# Patient Record
Sex: MALE | Race: WHITE | NOT HISPANIC OR LATINO | Employment: FULL TIME | URBAN - METROPOLITAN AREA
[De-identification: names, ages, dates, MRNs, and addresses within clinical notes are randomized per-mention and may not be internally consistent; named-entity substitution may affect disease eponyms.]

---

## 2023-12-04 ENCOUNTER — HOSPITAL ENCOUNTER (EMERGENCY)
Facility: HOSPITAL | Age: 58
Discharge: HOME | End: 2023-12-04
Payer: COMMERCIAL

## 2023-12-04 VITALS
TEMPERATURE: 98.2 F | BODY MASS INDEX: 39.78 KG/M2 | HEIGHT: 74 IN | DIASTOLIC BLOOD PRESSURE: 96 MMHG | WEIGHT: 310 LBS | OXYGEN SATURATION: 97 % | SYSTOLIC BLOOD PRESSURE: 159 MMHG | HEART RATE: 74 BPM | RESPIRATION RATE: 18 BRPM

## 2023-12-04 DIAGNOSIS — S61.411A LACERATION OF RIGHT HAND WITHOUT FOREIGN BODY, INITIAL ENCOUNTER: Primary | ICD-10-CM

## 2023-12-04 PROCEDURE — 99284 EMERGENCY DEPT VISIT MOD MDM: CPT

## 2023-12-04 PROCEDURE — 90471 IMMUNIZATION ADMIN: CPT | Performed by: PHYSICIAN ASSISTANT

## 2023-12-04 PROCEDURE — 90715 TDAP VACCINE 7 YRS/> IM: CPT | Performed by: PHYSICIAN ASSISTANT

## 2023-12-04 PROCEDURE — 2500000004 HC RX 250 GENERAL PHARMACY W/ HCPCS (ALT 636 FOR OP/ED): Performed by: PHYSICIAN ASSISTANT

## 2023-12-04 PROCEDURE — 12002 RPR S/N/AX/GEN/TRNK2.6-7.5CM: CPT | Performed by: PHYSICIAN ASSISTANT

## 2023-12-04 PROCEDURE — 99283 EMERGENCY DEPT VISIT LOW MDM: CPT | Mod: 25

## 2023-12-04 RX ADMIN — TETANUS TOXOID, REDUCED DIPHTHERIA TOXOID AND ACELLULAR PERTUSSIS VACCINE, ADSORBED 0.5 ML: 5; 2.5; 8; 8; 2.5 SUSPENSION INTRAMUSCULAR at 13:23

## 2023-12-04 ASSESSMENT — COLUMBIA-SUICIDE SEVERITY RATING SCALE - C-SSRS
6. HAVE YOU EVER DONE ANYTHING, STARTED TO DO ANYTHING, OR PREPARED TO DO ANYTHING TO END YOUR LIFE?: NO
1. IN THE PAST MONTH, HAVE YOU WISHED YOU WERE DEAD OR WISHED YOU COULD GO TO SLEEP AND NOT WAKE UP?: NO
2. HAVE YOU ACTUALLY HAD ANY THOUGHTS OF KILLING YOURSELF?: NO

## 2023-12-04 NOTE — ED PROVIDER NOTES
EMERGENCY MEDICINE EVALUATION NOTE    History of Present Illness     Chief Complaint:   Chief Complaint   Patient presents with    Laceration       HPI: Kaz Michel is a 58 y.o. male presents with a chief complaint of to the right hand.  Patient reports that he was working on an ice machine when he dropped the part.  He states that he reached into grab the part and the metal door fell down onto the right hand.  Patient states that he has a laceration over the right hand onto the wrist.  Patient denies any loss of range of motion to the wrist.  Patient states that he came in because he felt the laceration stitches.  Patient denies any other significant past medical history denies medication allergies.  Patient is unsure of last tetanus.    Previous History   No past medical history on file.  No past surgical history on file.     No family history on file.  No Known Allergies  No current outpatient medications    Physical Exam     Appearance: Alert, oriented , cooperative,  in no acute distress.      Skin: 4 cm laceration over the dorsal aspect of the right hand onto the wrist.  Dry skin, no lesions, rash, petechiae or purpura.      Eyes: PERRLA, EOMs intact,  Conjunctiva pink      ENT: Hearing grossly intact.      Neck: Supple. Trachea at midline.     Pulmonary: Clear bilaterally. No rales, rhonchi or wheezing. No accessory muscle use or stridor.     Cardiac: Normal rate and rhythm without murmur     Abdomen: Soft, nontender, active bowel sounds.     Musculoskeletal: Full range of motion.  Patient can flex and extend wrist with no difficulty.  There is no appear to be any tendon involvement.     Neurological:Cranial nerves II through XII are grossly intact, normal sensation, no weakness, no focal findings identified.     Results   Labs Reviewed - No data to display  No orders to display         ED Course & Medical Decision Making     Medications   diphth,pertus(acell),tetanus (BoostRIX) 2.5-8-5 Lf-mcg-Lf/0.5mL  "vaccine 0.5 mL (has no administration in time range)     Heart Rate:  [74]   Temp:  [36.8 °C (98.2 °F)]   Resp:  [18]   BP: (159)/(96)   Height:  [188 cm (6' 2\")]   Weight:  [141 kg (310 lb)]   SpO2:  [97 %]    Diagnoses as of 12/04/23 1311   Laceration of right hand without foreign body, initial encounter     Kaz Michel is a 58 y.o. male presents with a chief complaint of to the right hand.  Patient reports that he was working on an ice machine when he dropped the part.  He states that he reached into grab the part and the metal door fell down onto the right hand.  Patient states that he has a laceration over the right hand onto the wrist.  Patient denies any loss of range of motion to the wrist.  Patient states that he came in because he felt the laceration stitches.  Patient denies any other significant past medical history denies medication allergies.  Patient had laceration repaired in the emergency department today by physician assistant student with my direct supervision.  Patient will have tetanus shot updated prior to discharge.  Patient was instructed to have sutures removed in 7 to 10 days or return here immediately with any worsening symptoms.  Patient is in agreement with the plan of care.  He was also instructed to watch out for signs and symptoms of infection.    Procedures   Laceration Repair    Performed by: Robby Medina PA-C  Authorized by: Robby Medina PA-C    Consent:     Consent obtained:  Verbal    Consent given by:  Patient    Risks discussed:  Infection, pain and poor cosmetic result  Laceration details:     Location:  Hand    Hand location:  R wrist    Length (cm):  4  Exploration:     Imaging outcome: foreign body not noted      Wound exploration: wound explored through full range of motion    Treatment:     Area cleansed with:  Chlorhexidine and saline    Amount of cleaning:  Standard    Irrigation solution:  Sterile saline    Visualized foreign bodies/material removed: no      " Debridement:  None  Skin repair:     Repair method:  Sutures    Suture size:  4-0    Suture material:  Nylon    Suture technique:  Simple interrupted    Number of sutures:  8  Repair type:     Repair type:  Simple  Post-procedure details:     Dressing:  Open (no dressing)    Procedure completion:  Tolerated      Diagnosis     1. Laceration of right hand without foreign body, initial encounter        Disposition   Discharged    ED Prescriptions    None         Disclaimer: This note was dictated by speech recognition. Minor errors in transcription may be present. Please call if questions.       Robby Medina PA-C  12/04/23 4581

## 2023-12-04 NOTE — ED TRIAGE NOTES
Patient cut right dorsal thumb about 5 cm while working on ice machine.  This occurred within the last 2 hours, .bleding controlled ROM and strength appears to be intact.

## 2024-05-22 ENCOUNTER — APPOINTMENT (OUTPATIENT)
Dept: CARDIOLOGY | Facility: HOSPITAL | Age: 59
End: 2024-05-22
Payer: COMMERCIAL

## 2024-05-22 ENCOUNTER — HOSPITAL ENCOUNTER (OUTPATIENT)
Facility: HOSPITAL | Age: 59
Setting detail: OBSERVATION
Discharge: HOME | End: 2024-05-23
Attending: EMERGENCY MEDICINE | Admitting: INTERNAL MEDICINE
Payer: COMMERCIAL

## 2024-05-22 ENCOUNTER — APPOINTMENT (OUTPATIENT)
Dept: RADIOLOGY | Facility: HOSPITAL | Age: 59
End: 2024-05-22
Payer: COMMERCIAL

## 2024-05-22 DIAGNOSIS — R07.89 OTHER CHEST PAIN: ICD-10-CM

## 2024-05-22 DIAGNOSIS — R07.9 CHEST PAIN, UNSPECIFIED TYPE: Primary | ICD-10-CM

## 2024-05-22 LAB
ALBUMIN SERPL-MCNC: 4.3 G/DL (ref 3.5–5)
ALP BLD-CCNC: 127 U/L (ref 35–125)
ALT SERPL-CCNC: 19 U/L (ref 5–40)
ANION GAP SERPL CALC-SCNC: 17 MMOL/L
AST SERPL-CCNC: 17 U/L (ref 5–40)
BASOPHILS # BLD AUTO: 0.06 X10*3/UL (ref 0–0.1)
BASOPHILS NFR BLD AUTO: 0.9 %
BILIRUB SERPL-MCNC: 0.5 MG/DL (ref 0.1–1.2)
BUN SERPL-MCNC: 15 MG/DL (ref 8–25)
CALCIUM SERPL-MCNC: 9.1 MG/DL (ref 8.5–10.4)
CHLORIDE SERPL-SCNC: 100 MMOL/L (ref 97–107)
CO2 SERPL-SCNC: 19 MMOL/L (ref 24–31)
CREAT SERPL-MCNC: 1 MG/DL (ref 0.4–1.6)
EGFRCR SERPLBLD CKD-EPI 2021: 87 ML/MIN/1.73M*2
EOSINOPHIL # BLD AUTO: 0.15 X10*3/UL (ref 0–0.7)
EOSINOPHIL NFR BLD AUTO: 2.2 %
ERYTHROCYTE [DISTWIDTH] IN BLOOD BY AUTOMATED COUNT: 13.1 % (ref 11.5–14.5)
GLUCOSE SERPL-MCNC: 250 MG/DL (ref 65–99)
HCT VFR BLD AUTO: 48.8 % (ref 41–52)
HGB BLD-MCNC: 16.5 G/DL (ref 13.5–17.5)
IMM GRANULOCYTES # BLD AUTO: 0.02 X10*3/UL (ref 0–0.7)
IMM GRANULOCYTES NFR BLD AUTO: 0.3 % (ref 0–0.9)
LIPASE SERPL-CCNC: 56 U/L (ref 16–63)
LYMPHOCYTES # BLD AUTO: 2.04 X10*3/UL (ref 1.2–4.8)
LYMPHOCYTES NFR BLD AUTO: 29.4 %
MAGNESIUM SERPL-MCNC: 1.9 MG/DL (ref 1.6–3.1)
MCH RBC QN AUTO: 31.9 PG (ref 26–34)
MCHC RBC AUTO-ENTMCNC: 33.8 G/DL (ref 32–36)
MCV RBC AUTO: 94 FL (ref 80–100)
MONOCYTES # BLD AUTO: 0.96 X10*3/UL (ref 0.1–1)
MONOCYTES NFR BLD AUTO: 13.8 %
NEUTROPHILS # BLD AUTO: 3.72 X10*3/UL (ref 1.2–7.7)
NEUTROPHILS NFR BLD AUTO: 53.4 %
NRBC BLD-RTO: 0 /100 WBCS (ref 0–0)
PLATELET # BLD AUTO: 300 X10*3/UL (ref 150–450)
POTASSIUM SERPL-SCNC: 3.8 MMOL/L (ref 3.4–5.1)
PROT SERPL-MCNC: 6.8 G/DL (ref 5.9–7.9)
RBC # BLD AUTO: 5.18 X10*6/UL (ref 4.5–5.9)
SODIUM SERPL-SCNC: 136 MMOL/L (ref 133–145)
TROPONIN T SERPL-MCNC: 15 NG/L
TROPONIN T SERPL-MCNC: 16 NG/L
TROPONIN T SERPL-MCNC: 16 NG/L
WBC # BLD AUTO: 7 X10*3/UL (ref 4.4–11.3)

## 2024-05-22 PROCEDURE — 93306 TTE W/DOPPLER COMPLETE: CPT

## 2024-05-22 PROCEDURE — 84484 ASSAY OF TROPONIN QUANT: CPT | Performed by: CLINICAL NURSE SPECIALIST

## 2024-05-22 PROCEDURE — 96372 THER/PROPH/DIAG INJ SC/IM: CPT | Performed by: NURSE PRACTITIONER

## 2024-05-22 PROCEDURE — 99285 EMERGENCY DEPT VISIT HI MDM: CPT | Mod: 25

## 2024-05-22 PROCEDURE — 71046 X-RAY EXAM CHEST 2 VIEWS: CPT

## 2024-05-22 PROCEDURE — G0378 HOSPITAL OBSERVATION PER HR: HCPCS

## 2024-05-22 PROCEDURE — 83690 ASSAY OF LIPASE: CPT | Performed by: CLINICAL NURSE SPECIALIST

## 2024-05-22 PROCEDURE — 2500000004 HC RX 250 GENERAL PHARMACY W/ HCPCS (ALT 636 FOR OP/ED): Performed by: NURSE PRACTITIONER

## 2024-05-22 PROCEDURE — 36415 COLL VENOUS BLD VENIPUNCTURE: CPT | Performed by: CLINICAL NURSE SPECIALIST

## 2024-05-22 PROCEDURE — 71046 X-RAY EXAM CHEST 2 VIEWS: CPT | Performed by: RADIOLOGY

## 2024-05-22 PROCEDURE — 83735 ASSAY OF MAGNESIUM: CPT | Performed by: CLINICAL NURSE SPECIALIST

## 2024-05-22 PROCEDURE — 2500000001 HC RX 250 WO HCPCS SELF ADMINISTERED DRUGS (ALT 637 FOR MEDICARE OP): Performed by: CLINICAL NURSE SPECIALIST

## 2024-05-22 PROCEDURE — 2500000004 HC RX 250 GENERAL PHARMACY W/ HCPCS (ALT 636 FOR OP/ED)

## 2024-05-22 PROCEDURE — 84075 ASSAY ALKALINE PHOSPHATASE: CPT | Performed by: CLINICAL NURSE SPECIALIST

## 2024-05-22 PROCEDURE — 93005 ELECTROCARDIOGRAM TRACING: CPT

## 2024-05-22 PROCEDURE — 99222 1ST HOSP IP/OBS MODERATE 55: CPT | Performed by: NURSE PRACTITIONER

## 2024-05-22 PROCEDURE — 2500000001 HC RX 250 WO HCPCS SELF ADMINISTERED DRUGS (ALT 637 FOR MEDICARE OP): Performed by: NURSE PRACTITIONER

## 2024-05-22 PROCEDURE — 85025 COMPLETE CBC W/AUTO DIFF WBC: CPT | Performed by: CLINICAL NURSE SPECIALIST

## 2024-05-22 PROCEDURE — 2500000006 HC RX 250 W HCPCS SELF ADMINISTERED DRUGS (ALT 637 FOR ALL PAYERS): Performed by: NURSE PRACTITIONER

## 2024-05-22 PROCEDURE — 93306 TTE W/DOPPLER COMPLETE: CPT | Performed by: INTERNAL MEDICINE

## 2024-05-22 RX ORDER — ACETAMINOPHEN 325 MG/1
650 TABLET ORAL EVERY 4 HOURS PRN
Status: DISCONTINUED | OUTPATIENT
Start: 2024-05-22 | End: 2024-05-23 | Stop reason: HOSPADM

## 2024-05-22 RX ORDER — NITROGLYCERIN 0.4 MG/1
0.4 TABLET SUBLINGUAL ONCE
Status: COMPLETED | OUTPATIENT
Start: 2024-05-22 | End: 2024-05-22

## 2024-05-22 RX ORDER — NAPROXEN SODIUM 220 MG/1
324 TABLET, FILM COATED ORAL ONCE
Status: COMPLETED | OUTPATIENT
Start: 2024-05-22 | End: 2024-05-22

## 2024-05-22 RX ORDER — LAMOTRIGINE 200 MG/1
200 TABLET ORAL 2 TIMES DAILY
COMMUNITY

## 2024-05-22 RX ORDER — NAPROXEN SODIUM 220 MG/1
81 TABLET, FILM COATED ORAL DAILY
Status: DISCONTINUED | OUTPATIENT
Start: 2024-05-23 | End: 2024-05-23 | Stop reason: HOSPADM

## 2024-05-22 RX ORDER — LISINOPRIL 20 MG/1
20 TABLET ORAL DAILY
Status: DISCONTINUED | OUTPATIENT
Start: 2024-05-23 | End: 2024-05-23 | Stop reason: HOSPADM

## 2024-05-22 RX ORDER — PANTOPRAZOLE SODIUM 40 MG/10ML
40 INJECTION, POWDER, LYOPHILIZED, FOR SOLUTION INTRAVENOUS DAILY
Status: DISCONTINUED | OUTPATIENT
Start: 2024-05-22 | End: 2024-05-23 | Stop reason: HOSPADM

## 2024-05-22 RX ORDER — DEXTROSE 50 % IN WATER (D50W) INTRAVENOUS SYRINGE
12.5
Status: DISCONTINUED | OUTPATIENT
Start: 2024-05-22 | End: 2024-05-23 | Stop reason: HOSPADM

## 2024-05-22 RX ORDER — BUPROPION HYDROCHLORIDE 300 MG/1
300 TABLET ORAL DAILY
COMMUNITY

## 2024-05-22 RX ORDER — HEPARIN SODIUM 5000 [USP'U]/ML
5000 INJECTION, SOLUTION INTRAVENOUS; SUBCUTANEOUS EVERY 8 HOURS SCHEDULED
Status: DISCONTINUED | OUTPATIENT
Start: 2024-05-22 | End: 2024-05-23 | Stop reason: HOSPADM

## 2024-05-22 RX ORDER — LAMOTRIGINE 200 MG/1
200 TABLET ORAL 2 TIMES DAILY
Status: DISCONTINUED | OUTPATIENT
Start: 2024-05-22 | End: 2024-05-22

## 2024-05-22 RX ORDER — SIMVASTATIN 40 MG/1
40 TABLET, FILM COATED ORAL NIGHTLY
Status: DISCONTINUED | OUTPATIENT
Start: 2024-05-22 | End: 2024-05-23 | Stop reason: HOSPADM

## 2024-05-22 RX ORDER — LAMOTRIGINE 200 MG/1
200 TABLET ORAL 2 TIMES DAILY
Status: DISCONTINUED | OUTPATIENT
Start: 2024-05-22 | End: 2024-05-23 | Stop reason: HOSPADM

## 2024-05-22 RX ORDER — GLIPIZIDE 10 MG/1
10 TABLET ORAL
Status: DISCONTINUED | OUTPATIENT
Start: 2024-05-22 | End: 2024-05-23 | Stop reason: HOSPADM

## 2024-05-22 RX ORDER — METFORMIN HYDROCHLORIDE 1000 MG/1
1000 TABLET ORAL
COMMUNITY

## 2024-05-22 RX ORDER — NITROGLYCERIN 0.4 MG/1
0.4 TABLET SUBLINGUAL EVERY 5 MIN PRN
Status: DISCONTINUED | OUTPATIENT
Start: 2024-05-22 | End: 2024-05-23 | Stop reason: HOSPADM

## 2024-05-22 RX ORDER — ONDANSETRON HYDROCHLORIDE 2 MG/ML
4 INJECTION, SOLUTION INTRAVENOUS EVERY 6 HOURS PRN
Status: DISCONTINUED | OUTPATIENT
Start: 2024-05-22 | End: 2024-05-23 | Stop reason: HOSPADM

## 2024-05-22 RX ORDER — LISINOPRIL 20 MG/1
20 TABLET ORAL DAILY
COMMUNITY

## 2024-05-22 RX ORDER — ACETAMINOPHEN 160 MG/5ML
650 SOLUTION ORAL EVERY 4 HOURS PRN
Status: DISCONTINUED | OUTPATIENT
Start: 2024-05-22 | End: 2024-05-23 | Stop reason: HOSPADM

## 2024-05-22 RX ORDER — GLIPIZIDE 10 MG/1
10 TABLET ORAL
COMMUNITY

## 2024-05-22 RX ORDER — BUPROPION HYDROCHLORIDE 300 MG/1
300 TABLET ORAL NIGHTLY
Status: DISCONTINUED | OUTPATIENT
Start: 2024-05-22 | End: 2024-05-23 | Stop reason: HOSPADM

## 2024-05-22 RX ORDER — DEXTROSE 50 % IN WATER (D50W) INTRAVENOUS SYRINGE
25
Status: DISCONTINUED | OUTPATIENT
Start: 2024-05-22 | End: 2024-05-23 | Stop reason: HOSPADM

## 2024-05-22 RX ORDER — METFORMIN HYDROCHLORIDE 1000 MG/1
1000 TABLET ORAL
Status: DISCONTINUED | OUTPATIENT
Start: 2024-05-22 | End: 2024-05-23 | Stop reason: HOSPADM

## 2024-05-22 RX ORDER — ACETAMINOPHEN 650 MG/1
650 SUPPOSITORY RECTAL EVERY 4 HOURS PRN
Status: DISCONTINUED | OUTPATIENT
Start: 2024-05-22 | End: 2024-05-23 | Stop reason: HOSPADM

## 2024-05-22 RX ORDER — SIMVASTATIN 40 MG/1
40 TABLET, FILM COATED ORAL NIGHTLY
COMMUNITY

## 2024-05-22 RX ORDER — PANTOPRAZOLE SODIUM 40 MG/1
40 TABLET, DELAYED RELEASE ORAL
COMMUNITY

## 2024-05-22 RX ORDER — BUPROPION HYDROCHLORIDE 300 MG/1
300 TABLET ORAL DAILY
Status: DISCONTINUED | OUTPATIENT
Start: 2024-05-22 | End: 2024-05-22

## 2024-05-22 RX ADMIN — NITROGLYCERIN 0.4 MG: 0.4 TABLET SUBLINGUAL at 08:35

## 2024-05-22 RX ADMIN — GLIPIZIDE 10 MG: 5 TABLET ORAL at 15:12

## 2024-05-22 RX ADMIN — ASPIRIN 324 MG: 81 TABLET, CHEWABLE ORAL at 08:35

## 2024-05-22 RX ADMIN — BUPROPION HYDROCHLORIDE 300 MG: 300 TABLET, FILM COATED, EXTENDED RELEASE ORAL at 20:54

## 2024-05-22 RX ADMIN — PERFLUTREN 2 ML OF DILUTION: 6.52 INJECTION, SUSPENSION INTRAVENOUS at 13:26

## 2024-05-22 RX ADMIN — METFORMIN HYDROCHLORIDE 1000 MG: 1000 TABLET ORAL at 16:29

## 2024-05-22 RX ADMIN — SIMVASTATIN 40 MG: 40 TABLET, FILM COATED ORAL at 20:54

## 2024-05-22 RX ADMIN — HEPARIN SODIUM 5000 UNITS: 5000 INJECTION, SOLUTION INTRAVENOUS; SUBCUTANEOUS at 14:00

## 2024-05-22 RX ADMIN — LAMOTRIGINE 200 MG: 200 TABLET ORAL at 20:54

## 2024-05-22 RX ADMIN — HEPARIN SODIUM 5000 UNITS: 5000 INJECTION, SOLUTION INTRAVENOUS; SUBCUTANEOUS at 23:20

## 2024-05-22 SDOH — SOCIAL STABILITY: SOCIAL NETWORK
DO YOU BELONG TO ANY CLUBS OR ORGANIZATIONS SUCH AS CHURCH GROUPS UNIONS, FRATERNAL OR ATHLETIC GROUPS, OR SCHOOL GROUPS?: NO

## 2024-05-22 SDOH — SOCIAL STABILITY: SOCIAL INSECURITY
WITHIN THE LAST YEAR, HAVE TO BEEN RAPED OR FORCED TO HAVE ANY KIND OF SEXUAL ACTIVITY BY YOUR PARTNER OR EX-PARTNER?: NO

## 2024-05-22 SDOH — SOCIAL STABILITY: SOCIAL INSECURITY
WITHIN THE LAST YEAR, HAVE YOU BEEN KICKED, HIT, SLAPPED, OR OTHERWISE PHYSICALLY HURT BY YOUR PARTNER OR EX-PARTNER?: NO

## 2024-05-22 SDOH — SOCIAL STABILITY: SOCIAL INSECURITY: WITHIN THE LAST YEAR, HAVE YOU BEEN AFRAID OF YOUR PARTNER OR EX-PARTNER?: NO

## 2024-05-22 SDOH — ECONOMIC STABILITY: FOOD INSECURITY: WITHIN THE PAST 12 MONTHS, THE FOOD YOU BOUGHT JUST DIDN'T LAST AND YOU DIDN'T HAVE MONEY TO GET MORE.: NEVER TRUE

## 2024-05-22 SDOH — SOCIAL STABILITY: SOCIAL INSECURITY: DOES ANYONE TRY TO KEEP YOU FROM HAVING/CONTACTING OTHER FRIENDS OR DOING THINGS OUTSIDE YOUR HOME?: NO

## 2024-05-22 SDOH — ECONOMIC STABILITY: HOUSING INSECURITY
IN THE LAST 12 MONTHS, WAS THERE A TIME WHEN YOU DID NOT HAVE A STEADY PLACE TO SLEEP OR SLEPT IN A SHELTER (INCLUDING NOW)?: NO

## 2024-05-22 SDOH — HEALTH STABILITY: MENTAL HEALTH
STRESS IS WHEN SOMEONE FEELS TENSE, NERVOUS, ANXIOUS, OR CAN'T SLEEP AT NIGHT BECAUSE THEIR MIND IS TROUBLED. HOW STRESSED ARE YOU?: NOT AT ALL

## 2024-05-22 SDOH — SOCIAL STABILITY: SOCIAL NETWORK: ARE YOU MARRIED, WIDOWED, DIVORCED, SEPARATED, NEVER MARRIED, OR LIVING WITH A PARTNER?: MARRIED

## 2024-05-22 SDOH — SOCIAL STABILITY: SOCIAL INSECURITY: HAVE YOU HAD ANY THOUGHTS OF HARMING ANYONE ELSE?: NO

## 2024-05-22 SDOH — ECONOMIC STABILITY: INCOME INSECURITY: HOW HARD IS IT FOR YOU TO PAY FOR THE VERY BASICS LIKE FOOD, HOUSING, MEDICAL CARE, AND HEATING?: NOT HARD AT ALL

## 2024-05-22 SDOH — SOCIAL STABILITY: SOCIAL INSECURITY: DO YOU FEEL ANYONE HAS EXPLOITED OR TAKEN ADVANTAGE OF YOU FINANCIALLY OR OF YOUR PERSONAL PROPERTY?: NO

## 2024-05-22 SDOH — SOCIAL STABILITY: SOCIAL INSECURITY: WITHIN THE LAST YEAR, HAVE YOU BEEN HUMILIATED OR EMOTIONALLY ABUSED IN OTHER WAYS BY YOUR PARTNER OR EX-PARTNER?: NO

## 2024-05-22 SDOH — SOCIAL STABILITY: SOCIAL INSECURITY: DO YOU FEEL UNSAFE GOING BACK TO THE PLACE WHERE YOU ARE LIVING?: NO

## 2024-05-22 SDOH — ECONOMIC STABILITY: INCOME INSECURITY: IN THE LAST 12 MONTHS, WAS THERE A TIME WHEN YOU WERE NOT ABLE TO PAY THE MORTGAGE OR RENT ON TIME?: NO

## 2024-05-22 SDOH — ECONOMIC STABILITY: HOUSING INSECURITY: IN THE LAST 12 MONTHS, HOW MANY PLACES HAVE YOU LIVED?: 1

## 2024-05-22 SDOH — ECONOMIC STABILITY: TRANSPORTATION INSECURITY
IN THE PAST 12 MONTHS, HAS LACK OF TRANSPORTATION KEPT YOU FROM MEETINGS, WORK, OR FROM GETTING THINGS NEEDED FOR DAILY LIVING?: NO

## 2024-05-22 SDOH — ECONOMIC STABILITY: FOOD INSECURITY: WITHIN THE PAST 12 MONTHS, YOU WORRIED THAT YOUR FOOD WOULD RUN OUT BEFORE YOU GOT MONEY TO BUY MORE.: NEVER TRUE

## 2024-05-22 SDOH — SOCIAL STABILITY: SOCIAL INSECURITY: ARE THERE ANY APPARENT SIGNS OF INJURIES/BEHAVIORS THAT COULD BE RELATED TO ABUSE/NEGLECT?: NO

## 2024-05-22 SDOH — SOCIAL STABILITY: SOCIAL INSECURITY: HAS ANYONE EVER THREATENED TO HURT YOUR FAMILY OR YOUR PETS?: NO

## 2024-05-22 SDOH — SOCIAL STABILITY: SOCIAL NETWORK: HOW OFTEN DO YOU GET TOGETHER WITH FRIENDS OR RELATIVES?: NEVER

## 2024-05-22 SDOH — SOCIAL STABILITY: SOCIAL INSECURITY: HAVE YOU HAD THOUGHTS OF HARMING ANYONE ELSE?: YES

## 2024-05-22 SDOH — SOCIAL STABILITY: SOCIAL INSECURITY: ARE YOU OR HAVE YOU BEEN THREATENED OR ABUSED PHYSICALLY, EMOTIONALLY, OR SEXUALLY BY ANYONE?: NO

## 2024-05-22 SDOH — SOCIAL STABILITY: SOCIAL NETWORK: IN A TYPICAL WEEK, HOW MANY TIMES DO YOU TALK ON THE PHONE WITH FAMILY, FRIENDS, OR NEIGHBORS?: NEVER

## 2024-05-22 SDOH — SOCIAL STABILITY: SOCIAL NETWORK: HOW OFTEN DO YOU ATTENT MEETINGS OF THE CLUB OR ORGANIZATION YOU BELONG TO?: NEVER

## 2024-05-22 SDOH — SOCIAL STABILITY: SOCIAL INSECURITY: WERE YOU ABLE TO COMPLETE ALL THE BEHAVIORAL HEALTH SCREENINGS?: YES

## 2024-05-22 SDOH — ECONOMIC STABILITY: TRANSPORTATION INSECURITY
IN THE PAST 12 MONTHS, HAS THE LACK OF TRANSPORTATION KEPT YOU FROM MEDICAL APPOINTMENTS OR FROM GETTING MEDICATIONS?: NO

## 2024-05-22 SDOH — HEALTH STABILITY: PHYSICAL HEALTH: ON AVERAGE, HOW MANY MINUTES DO YOU ENGAGE IN EXERCISE AT THIS LEVEL?: 0 MIN

## 2024-05-22 SDOH — SOCIAL STABILITY: SOCIAL INSECURITY: ABUSE: ADULT

## 2024-05-22 SDOH — SOCIAL STABILITY: SOCIAL NETWORK: HOW OFTEN DO YOU ATTEND CHURCH OR RELIGIOUS SERVICES?: NEVER

## 2024-05-22 SDOH — HEALTH STABILITY: PHYSICAL HEALTH: ON AVERAGE, HOW MANY DAYS PER WEEK DO YOU ENGAGE IN MODERATE TO STRENUOUS EXERCISE (LIKE A BRISK WALK)?: 0 DAYS

## 2024-05-22 ASSESSMENT — PAIN SCALES - WONG BAKER: WONGBAKER_NUMERICALRESPONSE: NO HURT

## 2024-05-22 ASSESSMENT — COGNITIVE AND FUNCTIONAL STATUS - GENERAL
DAILY ACTIVITIY SCORE: 24
DAILY ACTIVITIY SCORE: 24
MOBILITY SCORE: 24
MOBILITY SCORE: 24
PATIENT BASELINE BEDBOUND: NO

## 2024-05-22 ASSESSMENT — PAIN DESCRIPTION - DESCRIPTORS
DESCRIPTORS: PRESSURE
DESCRIPTORS: TIGHTNESS

## 2024-05-22 ASSESSMENT — ACTIVITIES OF DAILY LIVING (ADL)
GROOMING: INDEPENDENT
WALKS IN HOME: INDEPENDENT
JUDGMENT_ADEQUATE_SAFELY_COMPLETE_DAILY_ACTIVITIES: YES
LACK_OF_TRANSPORTATION: NO
HEARING - RIGHT EAR: FUNCTIONAL
HEARING - LEFT EAR: FUNCTIONAL
BATHING: INDEPENDENT
FEEDING YOURSELF: INDEPENDENT
DRESSING YOURSELF: INDEPENDENT
TOILETING: INDEPENDENT
ADEQUATE_TO_COMPLETE_ADL: YES
PATIENT'S MEMORY ADEQUATE TO SAFELY COMPLETE DAILY ACTIVITIES?: YES

## 2024-05-22 ASSESSMENT — PAIN DESCRIPTION - PROGRESSION: CLINICAL_PROGRESSION: NOT CHANGED

## 2024-05-22 ASSESSMENT — PAIN SCALES - GENERAL
PAINLEVEL_OUTOF10: 2
PAINLEVEL_OUTOF10: 4
PAINLEVEL_OUTOF10: 7
PAINLEVEL_OUTOF10: 6
PAINLEVEL_OUTOF10: 1
PAINLEVEL_OUTOF10: 0 - NO PAIN
PAINLEVEL_OUTOF10: 0 - NO PAIN

## 2024-05-22 ASSESSMENT — LIFESTYLE VARIABLES
SKIP TO QUESTIONS 9-10: 1
PRESCIPTION_ABUSE_PAST_12_MONTHS: NO
EVER HAD A DRINK FIRST THING IN THE MORNING TO STEADY YOUR NERVES TO GET RID OF A HANGOVER: NO
AUDIT-C TOTAL SCORE: 0
HOW OFTEN DO YOU HAVE A DRINK CONTAINING ALCOHOL: NEVER
TOTAL SCORE: 0
HOW OFTEN DO YOU HAVE 6 OR MORE DRINKS ON ONE OCCASION: NEVER
EVER FELT BAD OR GUILTY ABOUT YOUR DRINKING: NO
HAVE PEOPLE ANNOYED YOU BY CRITICIZING YOUR DRINKING: NO
HOW MANY STANDARD DRINKS CONTAINING ALCOHOL DO YOU HAVE ON A TYPICAL DAY: PATIENT DOES NOT DRINK
HAVE YOU EVER FELT YOU SHOULD CUT DOWN ON YOUR DRINKING: NO
SUBSTANCE_ABUSE_PAST_12_MONTHS: NO
AUDIT-C TOTAL SCORE: 0

## 2024-05-22 ASSESSMENT — COLUMBIA-SUICIDE SEVERITY RATING SCALE - C-SSRS
2. HAVE YOU ACTUALLY HAD ANY THOUGHTS OF KILLING YOURSELF?: NO
1. IN THE PAST MONTH, HAVE YOU WISHED YOU WERE DEAD OR WISHED YOU COULD GO TO SLEEP AND NOT WAKE UP?: NO
6. HAVE YOU EVER DONE ANYTHING, STARTED TO DO ANYTHING, OR PREPARED TO DO ANYTHING TO END YOUR LIFE?: NO

## 2024-05-22 ASSESSMENT — PAIN DESCRIPTION - PAIN TYPE: TYPE: ACUTE PAIN

## 2024-05-22 ASSESSMENT — PAIN DESCRIPTION - LOCATION: LOCATION: CHEST

## 2024-05-22 ASSESSMENT — PATIENT HEALTH QUESTIONNAIRE - PHQ9
SUM OF ALL RESPONSES TO PHQ9 QUESTIONS 1 & 2: 0
1. LITTLE INTEREST OR PLEASURE IN DOING THINGS: NOT AT ALL
2. FEELING DOWN, DEPRESSED OR HOPELESS: NOT AT ALL

## 2024-05-22 ASSESSMENT — HEART SCORE
TROPONIN: 1-3 TIMES NORMAL LIMIT
HEART SCORE: 4
HISTORY: SLIGHTLY SUSPICIOUS
ECG: NORMAL
RISK FACTORS: >2 RISK FACTORS OR HX OF ATHEROSCLEROTIC DISEASE
AGE: 45-64

## 2024-05-22 ASSESSMENT — PAIN DESCRIPTION - FREQUENCY: FREQUENCY: CONSTANT/CONTINUOUS

## 2024-05-22 ASSESSMENT — PAIN - FUNCTIONAL ASSESSMENT: PAIN_FUNCTIONAL_ASSESSMENT: 0-10

## 2024-05-22 NOTE — ED PROVIDER NOTES
The patient was seen in conjunction with the advanced practice provider, and I performed a substantive portion of the visit. I personally saw the patient and made/approved the management plan and take responsibility for the patient management. All medical decision making was performed by me. All laboratory studies, radiology, and EKGs were reviewed by me.     History: 58-year-old male presents for midsternal chest pain rating to the left side as well as left shoulder around 8 AM while he was driving in his vehicle.  No shortness of breath, nausea, diaphoresis.  No history of heart disease but does have hypertension, diabetes, and is a current smoker.  No ripping or tearing pain.  No radiation to back or abdomen.  No history of DVT or PE.  No recent travel, surgery, or immobilization.  No leg pain or swelling.  Physical exam:  Constitutional:  Awake, alert, well appearing, nontoxic  HEENT:  Normocephalic, atraumatic  Neck: Trachea midline, no stridor  Respiratory/Chest:  Clear to auscultation bilaterally, no wheezes, rhonchi, or rales  CV:  Regular rate and regular rhythm, no murmurs, gallops, or rubs, 2+ symmetrical bilateral radial pulses  Extremities/MSK: No peripheral edema, calf tenderness or palpable cords  Neuro: A/O, normal speech, strength 5/5 x 4, ambulates with a steady gait  Skin:  Warm and dry    MDM: Patient presents with chest pain most suspicious for ACS.  Additional consideration for but not limited to aortic dissection, mediastinitis, pericarditis, tamponade however based on history, physical exam, and workup I have low suspicion for these etiologies.  EKG with no evidence of STEMI.  Labs unremarkable.  Troponin 16, repeat 16.  Chest x-ray on my independent interpretation with no acute process therefore no evidence of pneumonia, clinically significant esophageal rupture/tear, pneumothorax.  Given heart score of 4 patient warrants admission for further workup and evaluation to rule out acute coronary  syndrome.  Patient has received full dose of aspirin.  Chest pain improved. Patient agreeable with this plan of care, admitted in stable condition.         Lisa Mai MD  05/22/24 3929

## 2024-05-22 NOTE — ED TRIAGE NOTES
Pt states that chest pain started at 0800 in the center of his chest radiating down his left arm.

## 2024-05-22 NOTE — CARE PLAN
The patient's goals for the shift include      The clinical goals for the shift include chest pain free    Problem: Pain  Goal: My pain/discomfort is manageable  Outcome: Progressing     Problem: Safety  Goal: Patient will be injury free during hospitalization  Outcome: Progressing  Goal: I will remain free of falls  Outcome: Progressing     Problem: Daily Care  Goal: Daily care needs are met  Outcome: Progressing     Problem: Psychosocial Needs  Goal: Demonstrates ability to cope with hospitalization/illness  Outcome: Progressing     Problem: Discharge Barriers  Goal: My discharge needs are met  Outcome: Progressing

## 2024-05-22 NOTE — Clinical Note
Is the patient on isolation?: No   Do you expect the patient to require telemetry (informational-only for bed management): Yes   Do you expect the patient to require observation or inpt? (informational-only for bed management): Observation   Bed Type: Telemetry [3]

## 2024-05-22 NOTE — ED PROVIDER NOTES
Department of Emergency Medicine   ED  Provider Note  Admit Date/RoomTime: 5/22/2024  8:14 AM  ED Room: 05/[facility]        History of Present Illness:  Chief Complaint   Patient presents with    Chest Pain         Kaz Michel is a 58 y.o. male history of hypertension, diabetes, family history of coronary artery disease, obesity presenting to the ED for left-sided chest pain radiating left arm onset of symptoms 8 AM described as pressure and throbbing in his left arm, onset of symptoms while driving.  No associated symptoms of dizziness shortness of breath nausea vomiting sweating.  No exertional chest pain.  His pain is constant and rates it a 6 or 7 out of 10.  Has not taken an aspirin reported directly to the emergency department for evaluation.  He has no history of chest pain.  Has had a stuffy nose but no cough congestion runny nose sore throat.  No increased weight gain reports has had a 37 pound weight loss since the last time he was seen at a doctor's office.  Denies swelling in his legs.    Review of Systems:   Pertinent positives and negatives are stated within HPI, all other systems reviewed and are negative.        --------------------------------------------- PAST HISTORY ---------------------------------------------  Past Medical History:  has no past medical history on file.  Past Surgical History:  has no past surgical history on file.  Social History:    Family History: family history is not on file.. Unless otherwise noted, family history is non contributory  The patient’s home medications have been reviewed.  Allergies: Patient has no known allergies.        ---------------------------------------------------PHYSICAL EXAM--------------------------------------    GENERAL APPEARANCE: Awake and alert.   VITAL SIGNS: As per the nurses' triage record.  Elevated blood pressure  HEENT: Normocephalic, atraumatic. Extraocular muscles are intact. Conjunctiva are pink. Negative scleral icterus. Mucous  "membranes are moist. Tongue in the midline. Pharynx was without erythema or exudates, uvula midline  NECK: Soft Nontender and supple, full gross ROM, no meningeal signs.  CHEST: Nontender to palpation. Clear to auscultation bilaterally. No rales, rhonchi, or wheezing.   HEART: S1, S2. Regular rate and rhythm. No murmurs, gallops or rubs.  Strong and equal pulses in the extremities.   ABDOMEN: Soft, nontender, nondistended, positive bowel sounds, no palpable masses.  MUSCULCSKELETAL: The calves are nontender to palpation. Full gross active range of motion. Ambulating on own with no acute difficulties  NEUROLOGICAL: Awake, alert and oriented x 3. Power intact in the upper and lower extremities. Sensation is intact to light touch in the upper and lower extremities.   IMMUNOLOGICAL: No lymphatic streaking noted   DERM: No petechiae, rashes, or ecchymoses.          ------------------------- NURSING NOTES AND VITALS REVIEWED ---------------------------  The nursing notes within the ED encounter and vital signs as below have been reviewed by myself  /80 (BP Location: Left arm, Patient Position: Sitting)   Pulse 70   Temp 36.1 °C (97 °F) (Temporal)   Resp 15   Ht 1.88 m (6' 2\")   Wt 125 kg (276 lb 6.4 oz)   SpO2 96%   BMI 35.49 kg/m²     Oxygen Saturation Interpretation: 99% room air    The cardiac monitor revealed sinus rhythm with a heart rate in the 80s as interpreted by me. The cardiac monitor was ordered secondary to the patient's heart rate and to monitor the patient for dysrhythmia.       The patient’s available past medical records and past encounters were reviewed.          -----------------------DIAGNOSTIC RESULTS------------------------  LABS:    Labs Reviewed   COMPREHENSIVE METABOLIC PANEL - Abnormal       Result Value    Glucose 250 (*)     Sodium 136      Potassium 3.8      Chloride 100      Bicarbonate 19 (*)     Urea Nitrogen 15      Creatinine 1.00      eGFR 87      Calcium 9.1      Albumin " 4.3      Alkaline Phosphatase 127 (*)     Total Protein 6.8      AST 17      Bilirubin, Total 0.5      ALT 19      Anion Gap 17     SERIAL TROPONIN, INITIAL ([facility]) - Abnormal    Troponin T, High Sensitivity 16 (*)    SERIAL TROPONIN,  2 HOUR ([facility]) - Abnormal    Troponin T, High Sensitivity 16 (*)    MAGNESIUM - Normal    Magnesium 1.90     LIPASE - Normal    Lipase 56     CBC WITH AUTO DIFFERENTIAL    WBC 7.0      nRBC 0.0      RBC 5.18      Hemoglobin 16.5      Hematocrit 48.8      MCV 94      MCH 31.9      MCHC 33.8      RDW 13.1      Platelets 300      Neutrophils % 53.4      Immature Granulocytes %, Automated 0.3      Lymphocytes % 29.4      Monocytes % 13.8      Eosinophils % 2.2      Basophils % 0.9      Neutrophils Absolute 3.72      Immature Granulocytes Absolute, Automated 0.02      Lymphocytes Absolute 2.04      Monocytes Absolute 0.96      Eosinophils Absolute 0.15      Basophils Absolute 0.06     TROPONIN T SERIES, HIGH SENSITIVITY (0, 2 HR, 6 HR)    Narrative:     The following orders were created for panel order Troponin T Series, High Sensitivity (0, 2HR, 6HR).  Procedure                               Abnormality         Status                     ---------                               -----------         ------                     Serial Troponin, Initial...[[id number]]  Abnormal            Final result               Serial Troponin, 2 Hour ...[[id number]]  Abnormal            Final result               Serial Troponin, 6 Hour ...[[id number]]                                                   Please view results for these tests on the individual orders.   SERIAL TROPONIN, 6 HOUR ([facility])       As interpreted by me, the above displayed labs are abnormal. All other labs obtained during this visit were within normal range or not returned as of this dictation.      EKG Interpretation    0818 EKG on my independent interpretation: Normal sinus rhythm 82 bpm, normal axis, normal intervals, no acute ST or T wave  abnormalities [CANDIE]           XR chest 2 views   Final Result   1.  No evidence of acute cardiopulmonary process.                  MACRO:   None        Signed by: René Mccollumadarsh 5/22/2024 9:33 AM   Dictation workstation:   [id number]              XR chest 2 views   Final Result   1.  No evidence of acute cardiopulmonary process.                  MACRO:   None        Signed by: Zesergedelores Mccollumadarsh 5/22/2024 9:33 AM   Dictation workstation:   [id number]              ------------------------------ ED COURSE/MEDICAL DECISION MAKING----------------------  Medical Decision Making:   Exam: A medically appropriate exam performed, outlined above, given the known history and presentation.    History obtained from: Review of medical record nursing notes patient      Social Determinants of Health considered during this visit: Takes care of himself at home.  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       CC/HPI Summary, Social Determinants of health, Records Reviewed, DDx, testing done/not done, ED Course, Reassessment, disposition considerations/shared decision making with patient, consults, disposition:   Presents with left-sided chest pain radiating into the left arm  Plan  Aspirin  Chest x-ray  EKG  CBC  Nitroglycerin  Lipase    Medical Decision Making/Differential Diagnosis:  Differentials include but not limited to reflux versus biliary colic versus acute coronary syndrome versus electrolyte abnormality versus viral illness.  Complains of shortness of breath no hypoxia he is not tachycardic.  Review  Leukocyte 7  Hemoglobin 16.5  Glucose 250  Electrolytes within normal limits  Bicarb 19  BUN 15  Creatinine 1  LFTs within normal limits  Alkaline phosphatase 127  Lipase 56  Magnesium 1.9  Troponin 16  repeat  trop 16  Patient presents the emergency department complaints of chest pain radiating to the left arm.   Troponin 16 repeat troponin 16 patient did receive aspirin and nitro in the emergency department EKG per attending note no ST elevation or arrhythmia noted.  Patient's heart score is a 4.  Patient did have improvement with nitro and his chest pain.  Went from a 7 to a 5.  Resting position of comfort at this time.  Discussed admission with the patient is amenable to admission at this time.  Discussed case with observation unit nurse practitioners agree to accept the patient under their service for further evaluation and treatment.  Lipase normal magnesium normal glucose 250 no signs of DKA bicarb slightly low at 19 but no anion gap noted.  Electrolytes within normal limits normal renal function.  No elevation white blood cell count indicate indicate infection.  Chest x-ray no acute cardiopulmonary process and no signs of infection or fluid overload.  Patient is not anemic    Based on patient's clinical presentation history and symptoms consistent with chest pain requiring further evaluation and treatment elevated troponins elevated heart score and concerning history.  Patient be admitted to the observation unit for further evaluation and treatment patient amenable to admission    Patient seen and evaluated with attending physician Dr. Mai  PROCEDURES  Unless otherwise noted below, none      CONSULTS:   None      ED Course as of 05/22/24 1115   Wed May 22, 2024   0818 EKG on my independent interpretation: Normal sinus rhythm 82 bpm, normal axis, normal intervals, no acute ST or T wave abnormalities [CANDIE]   1054 Spoke with Leelee Bartlett nurse practitioner observation unit has agreed to accept the patient under their service for further evaluation and treatment of chest pain request admission placed under Dr. Reece  [TB]   1100 Discussed test results laboratory data with patient.  Discussed risk factors with him patient is amenable to admission to the hospital for further evaluation and treatment.  His pain  improved from a 7 to a 5 after receiving the nitro. [TB]      ED Course User Index  [CANDIE] Lisa Mai MD  [TB] LAW Fischer         Diagnoses as of 05/22/24 1115   Chest pain, unspecified type         This patient has remained hemodynamically stable during their ED course.      Critical Care: none        Counseling:  The emergency provider has spoken with the patient and discussed today’s results, in addition to providing specific details for the plan of care and counseling regarding the diagnosis and prognosis.  Questions are answered at this time and they are agreeable with the plan.         --------------------------------- IMPRESSION AND DISPOSITION ---------------------------------    IMPRESSION  1. Chest pain, unspecified type        DISPOSITION  Disposition: Admit observation unit  Patient condition is improved stable         NOTE: This report was transcribed using voice recognition software. Every effort was made to ensure accuracy; however, inadvertent computerized transcription errors may be present      LAW Fiscehr  05/22/24 1115

## 2024-05-22 NOTE — PROGRESS NOTES
Pharmacy Medication History Review    Kaz Michel is a 58 y.o. male admitted for Chest pain. Pharmacy reviewed the patient's [id number] medications and allergies for accuracy.    Medications ADDED:  none  Medications CHANGED:  none  Medications REMOVED:   none     The list below reflects the updated PTA list. Comments regarding how patient may be taking medications differently can be found in the Admit Orders Activity  Prior to Admission Medications   Prescriptions Last Dose Informant   buPROPion XL (Wellbutrin XL) 300 mg 24 hr tablet 5/21/2024 self   Sig: Take 1 tablet (300 mg) by mouth once daily. Do not crush, chew, or split.   glipiZIDE (Glucotrol) 10 mg tablet 5/22/2024 self   Sig: Take 1 tablet (10 mg) by mouth 2 times a day before meals.   lamoTRIgine (LaMICtal) 200 mg tablet 5/22/2024 self   Sig: Take 1 tablet (200 mg) by mouth 2 times a day.   lisinopril 20 mg tablet 5/22/2024 self   Sig: Take 1 tablet (20 mg) by mouth once daily.   metFORMIN (Glucophage) 1,000 mg tablet 5/22/2024 self   Sig: Take 1 tablet (1,000 mg) by mouth 2 times daily (morning and late afternoon).   pantoprazole (ProtoNix) 40 mg EC tablet 5/22/2024 self   Sig: Take 1 tablet (40 mg) by mouth once daily in the morning. Take before meals. Do not crush, chew, or split.   simvastatin (Zocor) 40 mg tablet 5/21/2024 self   Sig: Take 1 tablet (40 mg) by mouth once daily at bedtime.      Facility-Administered Medications: None        The list below reflects the updated allergy list. Please review each documented allergy for additional clarification and justification.  Allergies  Reviewed by Ann Burnham on 5/22/2024   No Known Allergies         Pharmacy has been updated to [facility] Walk-in Appointment Scheduler.    Sources used to complete the med history include PTA medication list, Patient interview. Patient is a good historian.    Below are additional concerns with the patient's PTA list.  none    Ann Burnham  Please reach out via Epic  Secure Chat for questions

## 2024-05-22 NOTE — H&P
History Of Present Illness  Kaz Michel is a 58 y.o. malePMH: Essential HTN, presenting to the ED with complaints of chest pain. Patient endorses a sub-sternal cp with radiation to the left arm that started this am (05/22/24) at 8am while driving to work. Chest pain has since resolved after Asprin 324mg PO and Nitro 0.4 SL administration in the ED. After administration reports cp decreased from 7 to 5 in ED. During my examination chest pain has resolved. No known history of ACS or CAD. No known cardiac family hx. No recent illness, no known sick contacts , no recent hospitalizations. Current Cigarette smoker 1 pack/day refusing nicotine ptch, No alcohol , No drug use. NKDA. Reports a 37 lb weight loss since seeing PCP per chart review.  PCP-   No Cardiologist    Of note-Per patient last Cardiac Cath 25 years ago, patient unable to recall Cardiologist name. Patient endorses that workup was negative at that time. No records on chart.    ED Course:  EKG on Arrival: EKG on my independent interpretation: Normal sinus rhythm 82 bpm, normal axis, normal intervals, no acute ST or T wave abnormalities [   High Sensitivity Troponin's: 16, 16,  CXR: No evidence of acute cardiopulmonary process.   Glucose: 250  ED Medications Administered:  Aspirin 234mg PO x 1  Nitrostat SL 0.4mg x1    Past Medical History  No past medical history on file.    Surgical History  No past surgical history on file.     Social History  He has no history on file for tobacco use, alcohol use, and drug use.    Family History  No family history on file.     Allergies  Patient has no known allergies.    Review of Systems     Physical Exam  Vitals and nursing note reviewed.   Constitutional:       Appearance: Normal appearance.   HENT:      Head: Normocephalic and atraumatic.      Nose: Nose normal.      Mouth/Throat:      Mouth: Mucous membranes are moist.   Eyes:      Pupils: Pupils are equal, round, and reactive to light.   Cardiovascular:     "  Rate and Rhythm: Normal rate.      Comments: Nsr hr 82  Pulmonary:      Effort: Pulmonary effort is normal.   Abdominal:      General: Abdomen is flat.   Genitourinary:     Comments: deferred  Musculoskeletal:         General: Normal range of motion.   Skin:     General: Skin is warm.      Capillary Refill: Capillary refill takes less than 2 seconds.   Neurological:      General: No focal deficit present.      Mental Status: He is alert and oriented to person, place, and time. Mental status is at baseline.   Psychiatric:         Mood and Affect: Mood normal.         Behavior: Behavior normal.         Thought Content: Thought content normal.         Judgment: Judgment normal.          Last Recorded Vitals  Blood pressure 116/80, pulse 70, temperature 36.1 °C (97 °F), temperature source Temporal, resp. rate 15, height 1.88 m (6' 2\"), weight 125 kg (276 lb 6.4 oz), SpO2 96%.    Relevant Results  Results for orders placed or performed during the hospital encounter of 05/22/24 (from the past 24 hour(s))   ECG 12 lead   Result Value Ref Range    Ventricular Rate 82 BPM    Atrial Rate 82 BPM    NM Interval 150 ms    QRS Duration 94 ms    QT Interval 386 ms    QTC Calculation(Bazett) 450 ms    P Axis 22 degrees    R Axis -10 degrees    T Axis 31 degrees    QRS Count 14 beats    Q Onset 222 ms    P Onset 147 ms    P Offset 209 ms    T Offset 415 ms    QTC Fredericia 428 ms   CBC and Auto Differential   Result Value Ref Range    WBC 7.0 4.4 - 11.3 x10*3/uL    nRBC 0.0 0.0 - 0.0 /100 WBCs    RBC 5.18 4.50 - 5.90 x10*6/uL    Hemoglobin 16.5 13.5 - 17.5 g/dL    Hematocrit 48.8 41.0 - 52.0 %    MCV 94 80 - 100 fL    MCH 31.9 26.0 - 34.0 pg    MCHC 33.8 32.0 - 36.0 g/dL    RDW 13.1 11.5 - 14.5 %    Platelets 300 150 - 450 x10*3/uL    Neutrophils % 53.4 40.0 - 80.0 %    Immature Granulocytes %, Automated 0.3 0.0 - 0.9 %    Lymphocytes % 29.4 13.0 - 44.0 %    Monocytes % 13.8 2.0 - 10.0 %    Eosinophils % 2.2 0.0 - 6.0 %    " Basophils % 0.9 0.0 - 2.0 %    Neutrophils Absolute 3.72 1.20 - 7.70 x10*3/uL    Immature Granulocytes Absolute, Automated 0.02 0.00 - 0.70 x10*3/uL    Lymphocytes Absolute 2.04 1.20 - 4.80 x10*3/uL    Monocytes Absolute 0.96 0.10 - 1.00 x10*3/uL    Eosinophils Absolute 0.15 0.00 - 0.70 x10*3/uL    Basophils Absolute 0.06 0.00 - 0.10 x10*3/uL   Comprehensive Metabolic Panel   Result Value Ref Range    Glucose 250 (H) 65 - 99 mg/dL    Sodium 136 133 - 145 mmol/L    Potassium 3.8 3.4 - 5.1 mmol/L    Chloride 100 97 - 107 mmol/L    Bicarbonate 19 (L) 24 - 31 mmol/L    Urea Nitrogen 15 8 - 25 mg/dL    Creatinine 1.00 0.40 - 1.60 mg/dL    eGFR 87 >60 mL/min/1.73m*2    Calcium 9.1 8.5 - 10.4 mg/dL    Albumin 4.3 3.5 - 5.0 g/dL    Alkaline Phosphatase 127 (H) 35 - 125 U/L    Total Protein 6.8 5.9 - 7.9 g/dL    AST 17 5 - 40 U/L    Bilirubin, Total 0.5 0.1 - 1.2 mg/dL    ALT 19 5 - 40 U/L    Anion Gap 17 <=19 mmol/L   Magnesium   Result Value Ref Range    Magnesium 1.90 1.60 - 3.10 mg/dL   Lipase   Result Value Ref Range    Lipase 56 16 - 63 U/L   Serial Troponin, Initial ([facility])   Result Value Ref Range    Troponin T, High Sensitivity 16 (HH) <=14 ng/L   Serial Troponin, 2 Hour ([facility])   Result Value Ref Range    Troponin T, High Sensitivity 16 (HH) <=14 ng/L          Assessment/Plan   Principal Problem:    Chest pain    Chest Pain-Resolved  -EKG on arrival: Normal sinus rhythm 82 bpm, normal axis, normal intervals, no acute ST or T wave abnormalities   -CXR: negative  -Trend HScTn: 16, 16,  -EKG PRN  -Telemetry  -Will order TTE for baseline  -Repeat labs in am (CBC, CMP, hga1c, TSH)  -NPO at midnight if further testing warranted in am.    2. Essential HTN  -Continue home medications including Lisinopril 20mg PO daily    3.DM II  -Accuchecks per protocol  -Continue home PO medications including Metformin 1000 PO BID, Glipizide 10 PO BID  -Hga1c in am    4. GERD  -Protonix IV daily    5. DVT Prophylaxis  -SCDS  -heparin  subcutaneous     05/22/24 @1138: Admit to CDU/OBS. Pending bed assignment. Will order TTE for baseline. HScTN  16, 16, will trend 3rd.  Will monitor overnight on telemetry for complaints of cp. NPO at midnight if further testing warranted in am.     Addendum 05/22/24 @1206: Will order Nuclear Stress for am. Keep NPO at midnight.    I spent 40 minutes in the professional and overall care of this patient.      Magaly Bartlett, APRN-CNP

## 2024-05-23 ENCOUNTER — APPOINTMENT (OUTPATIENT)
Dept: RADIOLOGY | Facility: HOSPITAL | Age: 59
End: 2024-05-23
Payer: COMMERCIAL

## 2024-05-23 ENCOUNTER — APPOINTMENT (OUTPATIENT)
Dept: CARDIOLOGY | Facility: HOSPITAL | Age: 59
End: 2024-05-23
Payer: COMMERCIAL

## 2024-05-23 VITALS
OXYGEN SATURATION: 97 % | BODY MASS INDEX: 35.47 KG/M2 | SYSTOLIC BLOOD PRESSURE: 140 MMHG | TEMPERATURE: 97.7 F | DIASTOLIC BLOOD PRESSURE: 80 MMHG | HEIGHT: 74 IN | RESPIRATION RATE: 19 BRPM | HEART RATE: 65 BPM | WEIGHT: 276.4 LBS

## 2024-05-23 LAB
ALBUMIN SERPL-MCNC: 4 G/DL (ref 3.5–5)
ALP BLD-CCNC: 109 U/L (ref 35–125)
ALT SERPL-CCNC: 18 U/L (ref 5–40)
ANION GAP SERPL CALC-SCNC: 10 MMOL/L
AORTIC VALVE PEAK VELOCITY: 1.28 M/S
AST SERPL-CCNC: 16 U/L (ref 5–40)
ATRIAL RATE: 82 BPM
AV PEAK GRADIENT: 6.6 MMHG
AVA (PEAK VEL): 2.4 CM2
BILIRUB SERPL-MCNC: 0.6 MG/DL (ref 0.1–1.2)
BUN SERPL-MCNC: 13 MG/DL (ref 8–25)
CALCIUM SERPL-MCNC: 9.4 MG/DL (ref 8.5–10.4)
CHLORIDE SERPL-SCNC: 106 MMOL/L (ref 97–107)
CO2 SERPL-SCNC: 22 MMOL/L (ref 24–31)
CREAT SERPL-MCNC: 1.1 MG/DL (ref 0.4–1.6)
EGFRCR SERPLBLD CKD-EPI 2021: 77 ML/MIN/1.73M*2
EJECTION FRACTION APICAL 4 CHAMBER: 66.9
ERYTHROCYTE [DISTWIDTH] IN BLOOD BY AUTOMATED COUNT: 13.2 % (ref 11.5–14.5)
EST. AVERAGE GLUCOSE BLD GHB EST-MCNC: 217 MG/DL
GLUCOSE SERPL-MCNC: 87 MG/DL (ref 65–99)
HBA1C MFR BLD: 9.2 %
HCT VFR BLD AUTO: 49.2 % (ref 41–52)
HGB BLD-MCNC: 16.4 G/DL (ref 13.5–17.5)
LEFT ATRIUM VOLUME AREA LENGTH INDEX BSA: 31 ML/M2
LEFT VENTRICLE INTERNAL DIMENSION DIASTOLE: 4.32 CM (ref 3.5–6)
LEFT VENTRICULAR OUTFLOW TRACT DIAMETER: 2.1 CM
LV EJECTION FRACTION BIPLANE: 68 %
MCH RBC QN AUTO: 31.5 PG (ref 26–34)
MCHC RBC AUTO-ENTMCNC: 33.3 G/DL (ref 32–36)
MCV RBC AUTO: 94 FL (ref 80–100)
MITRAL VALVE E/A RATIO: 0.95
MITRAL VALVE E/E' RATIO: 9.35
NRBC BLD-RTO: 0 /100 WBCS (ref 0–0)
P AXIS: 22 DEGREES
P OFFSET: 209 MS
P ONSET: 147 MS
PLATELET # BLD AUTO: 300 X10*3/UL (ref 150–450)
POTASSIUM SERPL-SCNC: 4.1 MMOL/L (ref 3.4–5.1)
PR INTERVAL: 150 MS
PROT SERPL-MCNC: 6.5 G/DL (ref 5.9–7.9)
Q ONSET: 222 MS
QRS COUNT: 14 BEATS
QRS DURATION: 94 MS
QT INTERVAL: 386 MS
QTC CALCULATION(BAZETT): 450 MS
QTC FREDERICIA: 428 MS
R AXIS: -10 DEGREES
RBC # BLD AUTO: 5.21 X10*6/UL (ref 4.5–5.9)
RIGHT VENTRICLE FREE WALL PEAK S': 11.7 CM/S
SODIUM SERPL-SCNC: 138 MMOL/L (ref 133–145)
T AXIS: 31 DEGREES
T OFFSET: 415 MS
TRICUSPID ANNULAR PLANE SYSTOLIC EXCURSION: 1.8 CM
TSH SERPL DL<=0.05 MIU/L-ACNC: 1.29 MIU/L (ref 0.27–4.2)
VENTRICULAR RATE: 82 BPM
WBC # BLD AUTO: 7.2 X10*3/UL (ref 4.4–11.3)

## 2024-05-23 PROCEDURE — 85027 COMPLETE CBC AUTOMATED: CPT | Performed by: NURSE PRACTITIONER

## 2024-05-23 PROCEDURE — A9502 TC99M TETROFOSMIN: HCPCS | Performed by: NURSE PRACTITIONER

## 2024-05-23 PROCEDURE — 3430000001 HC RX 343 DIAGNOSTIC RADIOPHARMACEUTICALS: Performed by: NURSE PRACTITIONER

## 2024-05-23 PROCEDURE — G0378 HOSPITAL OBSERVATION PER HR: HCPCS

## 2024-05-23 PROCEDURE — 2500000004 HC RX 250 GENERAL PHARMACY W/ HCPCS (ALT 636 FOR OP/ED): Performed by: NURSE PRACTITIONER

## 2024-05-23 PROCEDURE — C9113 INJ PANTOPRAZOLE SODIUM, VIA: HCPCS | Performed by: NURSE PRACTITIONER

## 2024-05-23 PROCEDURE — 84443 ASSAY THYROID STIM HORMONE: CPT | Performed by: NURSE PRACTITIONER

## 2024-05-23 PROCEDURE — 36415 COLL VENOUS BLD VENIPUNCTURE: CPT | Performed by: NURSE PRACTITIONER

## 2024-05-23 PROCEDURE — 96374 THER/PROPH/DIAG INJ IV PUSH: CPT | Mod: 59

## 2024-05-23 PROCEDURE — 93017 CV STRESS TEST TRACING ONLY: CPT

## 2024-05-23 PROCEDURE — 83036 HEMOGLOBIN GLYCOSYLATED A1C: CPT | Performed by: NURSE PRACTITIONER

## 2024-05-23 PROCEDURE — 96372 THER/PROPH/DIAG INJ SC/IM: CPT | Mod: 59 | Performed by: NURSE PRACTITIONER

## 2024-05-23 PROCEDURE — 78452 HT MUSCLE IMAGE SPECT MULT: CPT

## 2024-05-23 PROCEDURE — 78452 HT MUSCLE IMAGE SPECT MULT: CPT | Performed by: RADIOLOGY

## 2024-05-23 PROCEDURE — 80053 COMPREHEN METABOLIC PANEL: CPT | Performed by: NURSE PRACTITIONER

## 2024-05-23 PROCEDURE — 2500000004 HC RX 250 GENERAL PHARMACY W/ HCPCS (ALT 636 FOR OP/ED): Performed by: INTERNAL MEDICINE

## 2024-05-23 RX ORDER — REGADENOSON 0.08 MG/ML
0.4 INJECTION, SOLUTION INTRAVENOUS ONCE
Status: COMPLETED | OUTPATIENT
Start: 2024-05-23 | End: 2024-05-23

## 2024-05-23 RX ADMIN — HEPARIN SODIUM 5000 UNITS: 5000 INJECTION, SOLUTION INTRAVENOUS; SUBCUTANEOUS at 06:37

## 2024-05-23 RX ADMIN — TETROFOSMIN 33.6 MILLICURIE: 0.23 INJECTION, POWDER, LYOPHILIZED, FOR SOLUTION INTRAVENOUS at 10:20

## 2024-05-23 RX ADMIN — PANTOPRAZOLE SODIUM 40 MG: 40 INJECTION, POWDER, FOR SOLUTION INTRAVENOUS at 09:00

## 2024-05-23 RX ADMIN — REGADENOSON 0.4 MG: 0.08 INJECTION, SOLUTION INTRAVENOUS at 10:18

## 2024-05-23 RX ADMIN — HEPARIN SODIUM 5000 UNITS: 5000 INJECTION, SOLUTION INTRAVENOUS; SUBCUTANEOUS at 14:36

## 2024-05-23 RX ADMIN — TETROFOSMIN 10.6 MILLICURIE: 0.23 INJECTION, POWDER, LYOPHILIZED, FOR SOLUTION INTRAVENOUS at 08:14

## 2024-05-23 ASSESSMENT — COGNITIVE AND FUNCTIONAL STATUS - GENERAL
DAILY ACTIVITIY SCORE: 24
MOBILITY SCORE: 24

## 2024-05-23 ASSESSMENT — PAIN - FUNCTIONAL ASSESSMENT: PAIN_FUNCTIONAL_ASSESSMENT: 0-10

## 2024-05-23 ASSESSMENT — ACTIVITIES OF DAILY LIVING (ADL): LACK_OF_TRANSPORTATION: NO

## 2024-05-23 ASSESSMENT — PAIN SCALES - GENERAL: PAINLEVEL_OUTOF10: 0 - NO PAIN

## 2024-05-23 NOTE — CARE PLAN
The patient's goals for the shift include      The clinical goals for the shift include chest pain free    Over the shift, the patient did  make progress toward the following goals.       Problem: Pain  Goal: My pain/discomfort is manageable  Outcome: Progressing       Problem: Safety  Goal: I will remain free of falls  Outcome: Progressing

## 2024-05-23 NOTE — NURSING NOTE
Pt A&O x3 currently resting in bed. No complaints or concerns. Call light within reach. Pt npo for testing today.  Pt continues on tele per order.

## 2024-05-23 NOTE — CARE PLAN
The patient's goals for the shift include  comfort and get testing done so I can go home    The clinical goals for the shift include Pt will remain free from chest pain

## 2024-05-23 NOTE — NURSING NOTE
"Patient is a  59 y/o male came to ED with c/o \"chest pain radiating down left arm\".      H/o essential HTN, current smoker 1 PPD, GERD, T2DM 10-15 years, recent weight loss of 37 pounds.    Spoke with patient via telephone at [phone] to discuss self care management of T2DM.  Patient states prior to admission was taking Metformin 1000 mg bid, and glipizide 10 mg bid.    While inpatient is getting Metformin 1000 mg bid and Glipizide 10 mg bid.      Patient states, \"His PCP is Dr. Martinez in [place].  He gets all his diabetes medications through him.  Denies testing his blood sugars daily states, \"occasionally\"  States, His glucometer is newer than 3 yrs.  Denies need for outpatient diabetes education.       Blood glucose from complete metabolic panel today 5/23/24 at 06:10 was 87 mg/dl.    Discussed not smoking.      Will continue to monitor.   "

## 2024-05-23 NOTE — PROGRESS NOTES
Pt was MARILEE when MSW went to meet and complete assessment. Information obtained via chart review. Pt is . Preferred Pharmacy [facility] on [address] in [facility]. Pt PCP Dr. Martinez located in [facility]. Chart review is not indicating any home going needs. TCC will continue to follow.    Safe dc plan secured home   05/23/24 1033   Discharge Planning   Living Arrangements Spouse/significant other   Support Systems Spouse/significant other   Assistance Needed none   Type of Residence Private residence   Number of Stairs to Enter Residence 3   Do you have animals or pets at home? Yes   Type of Animals or Pets 6 cats   Home or Post Acute Services None   Patient expects to be discharged to: Home   Does the patient need discharge transport arranged? No   Financial Resource Strain   How hard is it for you to pay for the very basics like food, housing, medical care, and heating? Not hard   Housing Stability   In the last 12 months, was there a time when you were not able to pay the mortgage or rent on time? N   In the last 12 months, how many places have you lived? 1   In the last 12 months, was there a time when you did not have a steady place to sleep or slept in a shelter (including now)? N   Transportation Needs   In the past 12 months, has lack of transportation kept you from medical appointments or from getting medications? no   In the past 12 months, has lack of transportation kept you from meetings, work, or from getting things needed for daily living? No

## 2024-05-23 NOTE — PROGRESS NOTES
Kaz Michel is a 58 y.o. male on day 0 of admission presenting with Chest pain.      Subjective   Kaz Michel 58-year-old male was admitted to CDU observation with complaints of chest pain, seen and examined.  Patient endorses left substernal chest pain 2 out of 10 on pain scale.  No better or worse.  No radiation.  Patient denies cough, fever, chills, lightheadedness/dizziness, nausea/vomiting/diarrhea.       Objective     Last Recorded Vitals  /57 (BP Location: Left arm, Patient Position: Lying)   Pulse 64   Temp 36.7 °C (98.1 °F) (Oral)   Resp 18   Wt 125 kg (276 lb 6.4 oz)   SpO2 100%   Intake/Output last 3 Shifts:  No intake or output data in the 24 hours ending 05/23/24 0821    Admission Weight  Weight: 125 kg (276 lb 6.4 oz) (05/22/24 0819)    Daily Weight  05/22/24 : 125 kg (276 lb 6.4 oz)    Image Results  XR chest 2 views  Narrative: Interpreted By:  René Luz,   STUDY:  XR CHEST 2 VIEWS;  5/22/2024 8:49 am      INDICATION:  Signs/Symptoms:Chest Pain.      COMPARISON:  None.      ACCESSION NUMBER(S):  [id number]      ORDERING CLINICIAN:  ALIDA BLANTON      FINDINGS:                  CARDIOMEDIASTINAL SILHOUETTE:  Cardiomediastinal silhouette is normal in size and configuration.      LUNGS:  Lungs are clear.      ABDOMEN:  No remarkable upper abdominal findings.      BONES:  No acute osseous changes. Discogenic degenerative changes.      Impression: 1.  No evidence of acute cardiopulmonary process.              MACRO:  None      Signed by: René Luz 5/22/2024 9:33 AM  Dictation workstation:   [id number]  ECG 12 lead  Normal sinus rhythm  Low voltage QRS  Borderline ECG  No previous ECGs available      Physical Exam  Vitals and nursing note reviewed.   Constitutional:       General: He is not in acute distress.     Appearance: He is obese. He is not diaphoretic.   HENT:      Head: Normocephalic and atraumatic.      Nose: Nose normal.      Mouth/Throat:      Mouth: Mucous membranes  are moist.   Eyes:      Pupils: Pupils are equal, round, and reactive to light.   Cardiovascular:      Rate and Rhythm: Normal rate and regular rhythm.      Pulses: Normal pulses.      Heart sounds: Normal heart sounds. No murmur heard.     No gallop.      Comments: Normal sinus rhythm on telemetry  Pulmonary:      Effort: Pulmonary effort is normal. No respiratory distress.      Breath sounds: No wheezing.   Chest:      Chest wall: No tenderness.   Abdominal:      General: Bowel sounds are normal. There is no distension.      Palpations: Abdomen is soft.      Tenderness: There is no abdominal tenderness.   Genitourinary:     Comments: Deferred  Musculoskeletal:         General: Normal range of motion.      Cervical back: Normal range of motion and neck supple. No rigidity or tenderness.      Right lower leg: No edema.      Left lower leg: No edema.   Skin:     General: Skin is warm and dry.      Capillary Refill: Capillary refill takes less than 2 seconds.   Neurological:      General: No focal deficit present.      Mental Status: He is alert and oriented to person, place, and time.   Psychiatric:         Mood and Affect: Mood normal.         Behavior: Behavior normal.         Relevant Results             Results for orders placed or performed during the hospital encounter of 05/22/24 (from the past 24 hour(s))   Serial Troponin, 2 Hour ([facility])   Result Value Ref Range    Troponin T, High Sensitivity 16 (HH) <=14 ng/L   Transthoracic Echo (TTE) Complete   Result Value Ref Range    BSA 2.56 m2   Serial Troponin, 6 Hour ([facility])   Result Value Ref Range    Troponin T, High Sensitivity 15 (HH) <=14 ng/L   CBC   Result Value Ref Range    WBC 7.2 4.4 - 11.3 x10*3/uL    nRBC 0.0 0.0 - 0.0 /100 WBCs    RBC 5.21 4.50 - 5.90 x10*6/uL    Hemoglobin 16.4 13.5 - 17.5 g/dL    Hematocrit 49.2 41.0 - 52.0 %    MCV 94 80 - 100 fL    MCH 31.5 26.0 - 34.0 pg    MCHC 33.3 32.0 - 36.0 g/dL    RDW 13.2 11.5 - 14.5 %    Platelets 300 150 -  450 x10*3/uL   Comprehensive metabolic panel   Result Value Ref Range    Glucose 87 65 - 99 mg/dL    Sodium 138 133 - 145 mmol/L    Potassium 4.1 3.4 - 5.1 mmol/L    Chloride 106 97 - 107 mmol/L    Bicarbonate 22 (L) 24 - 31 mmol/L    Urea Nitrogen 13 8 - 25 mg/dL    Creatinine 1.10 0.40 - 1.60 mg/dL    eGFR 77 >60 mL/min/1.73m*2    Calcium 9.4 8.5 - 10.4 mg/dL    Albumin 4.0 3.5 - 5.0 g/dL    Alkaline Phosphatase 109 35 - 125 U/L    Total Protein 6.5 5.9 - 7.9 g/dL    AST 16 5 - 40 U/L    Bilirubin, Total 0.6 0.1 - 1.2 mg/dL    ALT 18 5 - 40 U/L    Anion Gap 10 <=19 mmol/L   Thyroid Stimulating Hormone   Result Value Ref Range    Thyroid Stimulating Hormone 1.29 0.27 - 4.20 mIU/L   Hemoglobin A1C   Result Value Ref Range    Hemoglobin A1C 9.2 (H) See below %    Estimated Average Glucose 217 Not Established mg/dL       Assessment/Plan      Principal Problem:    Chest pain    Chest Pain-Resolved  -EKG on arrival: Normal sinus rhythm 82 bpm, normal axis, normal intervals, no acute ST or T wave abnormalities   -CXR: negative  -HScTn near normal: 16, 16, 15  -EKG PRN  -Telemetry  -Will order TTE for baseline  -Repeat labs in am (CBC, CMP, hga1c, TSH)  -NPO at midnight if further testing warranted in am.     2. Essential HTN  -Continue home medications including Lisinopril 20mg PO daily     3.DM II  -Accuchecks per protocol  -Continue home PO medications including Metformin 1000 PO BID, Glipizide 10 PO BID  -Hga1c 9.2     4. GERD  -Protonix IV daily     5. DVT Prophylaxis  -SCDS  -heparin subcutaneous      05/22/24 @1138: Admit to CDU/OBS. Pending bed assignment. Will order TTE for baseline. HScTN  16, 16, will trend 3rd.  Will monitor overnight on telemetry for complaints of cp. NPO at midnight if further testing warranted in am.  Addendum 05/22/24 @1206: Will order Nuclear Stress for am. Keep NPO at midnight.     05/23/2024:   Patient seen and examined at bedside, complaint of mild chest pain, high-sensitivity troponin  near normal.  Uneventful night.  Hemoglobin A1c 9.2.  CBC without leukocytosis or anemia.  CMP shows no electrolyte imbalance.  Magnesium 1.9.  He is scheduled for nuclear stress test today.  Pending transthoracic echocardiogram.  Scheduled for nuclear stress test today for further risk stratification.    Plan to discharge home today if nuclear stress test normal.    I spent 20 minutes in the professional and overall care of this patient.       Adria Harris, APRN-CNP

## 2024-05-23 NOTE — PROGRESS NOTES
Spiritual Care Visit    Clinical Encounter Type  Visited With: Patient not available     Wang Sauceda

## 2024-05-23 NOTE — DISCHARGE SUMMARY
Discharge Diagnosis  Chest pain    Issues Requiring Follow-Up  PCP follow up back home in [place]   Establish care with cardiologist    Test Results Pending At Discharge  Pending Labs       No current pending labs.            Hospital Course   HPI from admission:   Kaz Michel is a 58 y.o. malePMH: Essential HTN, presenting to the ED with complaints of chest pain. Patient endorses a sub-sternal cp with radiation to the left arm that started this am (05/22/24) at 8am while driving to work. Chest pain has since resolved after Asprin 324mg PO and Nitro 0.4 SL administration in the ED. After administration reports cp decreased from 7 to 5 in ED. During my examination chest pain has resolved. No known history of ACS or CAD. No known cardiac family hx. No recent illness, no known sick contacts , no recent hospitalizations. Current Cigarette smoker 1 pack/day refusing nicotine ptch, No alcohol , No drug use. NKDA. Reports a 37 lb weight loss since seeing PCP per chart review.  PCP-   No Cardiologist    Brief hospital course:   Patient was admitted for observation and trending of cardiac troponins.  He was seen by cardiology and it was determined he would have a nuclear stress test done as well as an echocardiogram.  The results of both of these test were normal.  Nuclear stress test showed:1.  No definite evidence of ischemia or prior infarction.  2. Borderline mild left ventricular dilatation is noted.  3. Normal LV wall motion with an LV EF estimated at 50%.  Echocardiogram showed:  Left ventricular systolic function is normal with a 60-65% estimated ejection fraction.   Patients symptoms improved.  He is deemed stable for discharge at this time.  Attempt was made to have patient follow-up with Dr. Reece however he is from [place]. He was advised to follow-up with his PCP at home and to establish care with a cardiologist closer to home.  Patient agrees with plan and demonstrates understanding of  plan.    Discussed with attending cardiologist.       Pertinent Physical Exam At Time of Discharge  Physical Exam  Constitutional:       Appearance: Normal appearance.   HENT:      Head: Normocephalic.      Nose: Nose normal.      Mouth/Throat:      Mouth: Mucous membranes are moist.   Eyes:      Pupils: Pupils are equal, round, and reactive to light.   Cardiovascular:      Rate and Rhythm: Normal rate.      Pulses: Normal pulses.   Pulmonary:      Effort: Pulmonary effort is normal.   Abdominal:      General: Abdomen is flat.   Musculoskeletal:         General: Normal range of motion.      Cervical back: Normal range of motion.   Skin:     General: Skin is warm.   Neurological:      Mental Status: He is alert and oriented to person, place, and time.         Home Medications     Medication List      CONTINUE taking these medications     buPROPion  mg 24 hr tablet; Commonly known as: Wellbutrin XL   glipiZIDE 10 mg tablet; Commonly known as: Glucotrol   lamoTRIgine 200 mg tablet; Commonly known as: LaMICtal   lisinopril 20 mg tablet   metFORMIN 1,000 mg tablet; Commonly known as: Glucophage   pantoprazole 40 mg EC tablet; Commonly known as: ProtoNix   simvastatin 40 mg tablet; Commonly known as: Zocor       Outpatient Follow-Up  No future appointments.    Arti Meredith PA-C

## 2024-05-28 PROCEDURE — 93016 CV STRESS TEST SUPVJ ONLY: CPT | Performed by: INTERNAL MEDICINE

## 2024-05-28 PROCEDURE — 93018 CV STRESS TEST I&R ONLY: CPT | Performed by: INTERNAL MEDICINE
